# Patient Record
Sex: FEMALE | Race: WHITE | NOT HISPANIC OR LATINO | Employment: FULL TIME | URBAN - METROPOLITAN AREA
[De-identification: names, ages, dates, MRNs, and addresses within clinical notes are randomized per-mention and may not be internally consistent; named-entity substitution may affect disease eponyms.]

---

## 2018-02-14 ENCOUNTER — OFFICE VISIT (OUTPATIENT)
Dept: OBSTETRICS AND GYNECOLOGY | Facility: CLINIC | Age: 38
End: 2018-02-14

## 2018-02-14 VITALS
BODY MASS INDEX: 41.95 KG/M2 | HEART RATE: 78 BPM | SYSTOLIC BLOOD PRESSURE: 179 MMHG | HEIGHT: 70 IN | WEIGHT: 293 LBS | DIASTOLIC BLOOD PRESSURE: 97 MMHG

## 2018-02-14 DIAGNOSIS — Z30.014 ENCOUNTER FOR INITIAL PRESCRIPTION OF INTRAUTERINE CONTRACEPTIVE DEVICE (IUD): ICD-10-CM

## 2018-02-14 DIAGNOSIS — N93.9 ABNORMAL UTERINE BLEEDING (AUB): ICD-10-CM

## 2018-02-14 DIAGNOSIS — Z01.419 ENCOUNTER FOR GYNECOLOGICAL EXAMINATION WITHOUT ABNORMAL FINDING: Primary | ICD-10-CM

## 2018-02-14 PROCEDURE — 99395 PREV VISIT EST AGE 18-39: CPT | Performed by: OBSTETRICS & GYNECOLOGY

## 2018-02-14 RX ORDER — PROPRANOLOL HYDROCHLORIDE 20 MG/1
TABLET ORAL
COMMUNITY
Start: 2018-01-04

## 2018-02-14 RX ORDER — OMEPRAZOLE 20 MG/1
CAPSULE, DELAYED RELEASE ORAL
COMMUNITY
Start: 2018-01-04

## 2018-02-14 RX ORDER — LANOLIN ALCOHOL/MO/W.PET/CERES
1000 CREAM (GRAM) TOPICAL DAILY
COMMUNITY
End: 2022-02-16

## 2018-02-14 RX ORDER — LOSARTAN POTASSIUM AND HYDROCHLOROTHIAZIDE 12.5; 5 MG/1; MG/1
TABLET ORAL
COMMUNITY
Start: 2018-01-04 | End: 2022-02-16

## 2018-02-14 NOTE — PROGRESS NOTES
GYN Annual Exam     CC- Here for annual exam.     Allyson Cruz is a 37 y.o. female who presents for annual well woman exam. Periods are regular every 28-30 days, lasting 10  days. Dysmenorrhea:mild, occurring first 1-2 days of flow. Cyclic symptoms include none. No intermenstrual bleeding, spotting, or discharge.    OB History      Para Term  AB Living    3 3 3   3    SAB TAB Ectopic Multiple Live Births                  Current contraception: IUD  History of abnormal Pap smear: no  Family history of uterine, colon or ovarian cancer: no  History of abnormal mammogram: no  Family history of breast cancer: yes - maternal aunts   Last Pap : unknown    Past Medical History:   Diagnosis Date   • Anemia    • GERD (gastroesophageal reflux disease)    • Hypertension    • Psoriasis        Past Surgical History:   Procedure Laterality Date   • GALLBLADDER SURGERY           Current Outpatient Prescriptions:   •  losartan-hydrochlorothiazide (HYZAAR) 50-12.5 MG per tablet, , Disp: , Rfl:   •  omeprazole (priLOSEC) 20 MG capsule, , Disp: , Rfl:   •  propranolol (INDERAL) 20 MG tablet, , Disp: , Rfl:   •  triamcinolone (KENALOG) 0.1 % ointment, , Disp: , Rfl:   •  vitamin B-12 (CYANOCOBALAMIN) 1000 MCG tablet, Take 1,000 mcg by mouth Daily., Disp: , Rfl:     No Known Allergies    Social History   Substance Use Topics   • Smoking status: Never Smoker   • Smokeless tobacco: Never Used   • Alcohol use No       Family History   Problem Relation Age of Onset   • Heart disease Mother    • Hyperlipidemia Mother    • Diabetes Father    • Hypertension Father    • Heart disease Father    • Hyperlipidemia Father    • Breast cancer Maternal Aunt    • Ovarian cancer Neg Hx    • Uterine cancer Neg Hx    • Colon cancer Neg Hx    • Deep vein thrombosis Neg Hx    • Pulmonary embolism Neg Hx        Review of Systems   Constitutional: Negative for chills and fever.   Gastrointestinal: Negative for abdominal pain.   Genitourinary:  "Positive for menstrual problem and pelvic pain. Negative for dysuria, vaginal bleeding and vaginal discharge.   All other systems reviewed and are negative.      /97  Pulse 78  Ht 177.8 cm (70\")  Wt (!) 140 kg (308 lb)  LMP 02/02/2018 (Approximate)  Breastfeeding? No  BMI 44.19 kg/m2    Physical Exam   Constitutional: She is oriented to person, place, and time. She appears well-developed and well-nourished. No distress.   HENT:   Head: Normocephalic and atraumatic.   Eyes: Conjunctivae are normal.   Neck: Normal range of motion. Neck supple. No thyromegaly present.   Cardiovascular: Normal rate and regular rhythm.    No murmur heard.  Pulmonary/Chest: Effort normal and breath sounds normal. Right breast exhibits no inverted nipple, no mass and no nipple discharge. Left breast exhibits no inverted nipple, no mass and no nipple discharge.   Abdominal: Soft. Bowel sounds are normal. She exhibits no distension. There is no tenderness.   Genitourinary: Vagina normal and uterus normal. Pelvic exam was performed with patient supine. There is no lesion on the right labia. There is no lesion on the left labia. Uterus is not enlarged (anteverted, exam limited by habitus ), not fixed and not tender. Cervix exhibits no motion tenderness (strings seen ). Right adnexum displays no mass and no tenderness. Left adnexum displays no mass and no tenderness. No bleeding in the vagina. No vaginal discharge found.   Musculoskeletal: She exhibits no edema.   Lymphadenopathy:        Right: No inguinal adenopathy present.        Left: No inguinal adenopathy present.   Neurological: She is alert and oriented to person, place, and time.   Skin: No rash noted.   Psychiatric: She has a normal mood and affect. Her behavior is normal.          Assessment     1) GYN annual well woman exam.   2) Bleeding with IUD - IUD due to come out (at 5 year juanpablo)   She knows it is in cervix/EMELINA  Suspect this is the issue, due to replace and we are " concerned about time off for other surgery as she has new job.  So will try and replace in proper position and see if works better for her.      Plan     1) Breast Health - Clinical breast exam yearly, Self breast awareness monthly  2) Pap - updated today   3) Smoking status- non-smoker   4) Seat belts & Sunscreen recommended  5) Follow up prn and one year.     Cm Garcia MD   2/14/2018  1:00 PM

## 2018-02-19 LAB
CYTOLOGIST CVX/VAG CYTO: NORMAL
CYTOLOGY CVX/VAG DOC THIN PREP: NORMAL
DX ICD CODE: NORMAL
HIV 1 & 2 AB SER-IMP: NORMAL
HPV I/H RISK 1 DNA CVX QL PROBE+SIG AMP: NEGATIVE
OTHER STN SPEC: NORMAL
PATH REPORT.FINAL DX SPEC: NORMAL
STAT OF ADQ CVX/VAG CYTO-IMP: NORMAL

## 2018-04-18 ENCOUNTER — PROCEDURE VISIT (OUTPATIENT)
Dept: OBSTETRICS AND GYNECOLOGY | Facility: CLINIC | Age: 38
End: 2018-04-18

## 2018-04-18 VITALS
BODY MASS INDEX: 41.95 KG/M2 | SYSTOLIC BLOOD PRESSURE: 138 MMHG | HEIGHT: 70 IN | DIASTOLIC BLOOD PRESSURE: 87 MMHG | WEIGHT: 293 LBS | HEART RATE: 67 BPM

## 2018-04-18 DIAGNOSIS — Z30.430 ENCOUNTER FOR INSERTION OF MIRENA IUD: Primary | ICD-10-CM

## 2018-04-18 DIAGNOSIS — Z30.432 ENCOUNTER FOR IUD REMOVAL: ICD-10-CM

## 2018-04-18 LAB
B-HCG UR QL: NEGATIVE
INTERNAL NEGATIVE CONTROL: NEGATIVE
INTERNAL POSITIVE CONTROL: POSITIVE
Lab: NORMAL

## 2018-04-18 PROCEDURE — 58300 INSERT INTRAUTERINE DEVICE: CPT | Performed by: OBSTETRICS & GYNECOLOGY

## 2018-04-18 PROCEDURE — 58301 REMOVE INTRAUTERINE DEVICE: CPT | Performed by: OBSTETRICS & GYNECOLOGY

## 2018-04-18 PROCEDURE — 81025 URINE PREGNANCY TEST: CPT | Performed by: OBSTETRICS & GYNECOLOGY

## 2018-04-18 NOTE — PROGRESS NOTES
IUD Removal Procedure Note    Type of IUD:  Mirena  Date of insertion:  5 yrs ago  Reason for removal:  Device expiration  Other relevant history/information:  none    Procedure Time Out Documentation      Procedure Details  IUD strings visible:  yes  Local anesthesia:  None  Tenaculum used:  Yes, single tooth  Removal:  IUD strings grasped and broke off. Then able to use tenaculum, and IUD hook to bring down to her cervical os the lower aspect of IUD, I grasped that with ring forceps and removed intact.     All appropriate instructions regarding removal were reviewed.    Tolerated well  Comp : String broke, but able to remove with hook.   Post procedure diagnosis : IUD removal     Plans for contraception:  IUD    Other follow-up needed:  none    The patient was advised to call for any fever or for prolonged or severe pain or bleeding. She was advised to use NSAID as needed for mild to moderate pain.       Procedure: Mirena Intrauterine device insertion    Pre procedure indication 1) Desires Mirena  Post procedure indication 1) Desires Mirena     The risks, benefits, and alternatives to Mirena were explained at length with the patient. All her questions were answered and consents were signed. LOT# [id number], exp 10/2020. NDC# [id number]    The patient was placed in a dorsal lithotomy position on the examining table in [facility]. A bimanual exam confirmed the uterus was normal in size, anteverted. A warmed metal speculum was inserted into the vagina and the cervix was brought into view.    The cervix was prepped with Betadine. The anterior lip was grasped with a single-tooth tenaculum. The endometrial cavity was then sounded to 8 cm without use of a dilator. This sealed Mirena package was opened and the Mirena was removed in a sterile fashion.    The upper edge of the depth setting the flange was set at a uterine sound measured. The  was then carefully advanced to the cervical canal into the uterus to the level  of the fundus. This was then backed off about 1.5-2 cm to allow sufficient space for the arms to open. The slider was then retracted about 1 cm and deployed the device. The device was then gently advanced to the fundus. The Mirena was then released by pulling the slider down all the way. The  was removed carefully from the uterus. The threads were then cut leaving 2-3 cm visible outside of the cervix.  The single-tooth tenaculum was removed from the anterior lip. Good hemostasis was noted.     All other instruments were removed from the vagina.   There were no complications.  The patient tolerated the procedure well with a minimal amount of discomfort.    The patient was counseled about the need to return in 2 weeks for string check.     She was counseled about the need to use a backup method of contraception such as condoms until her post insertion exam was performed. The patient verbalized understanding that the Mirena will need to be removed/replaced after 5 years. The patient is counseled to contact us if she has any significant or increasing bleeding, pain, fever, chills, or other concerns. She is instructed to see a doctor right away if she believes that she may be pregnant at any time with the Mirena in place.    Cm Garcia MD  4/18/2018  1:35 PM

## 2018-05-16 ENCOUNTER — OFFICE VISIT (OUTPATIENT)
Dept: OBSTETRICS AND GYNECOLOGY | Facility: CLINIC | Age: 38
End: 2018-05-16

## 2018-05-16 VITALS
HEIGHT: 70 IN | SYSTOLIC BLOOD PRESSURE: 135 MMHG | DIASTOLIC BLOOD PRESSURE: 81 MMHG | WEIGHT: 293 LBS | HEART RATE: 72 BPM | BODY MASS INDEX: 41.95 KG/M2

## 2018-05-16 DIAGNOSIS — Z97.5 BREAKTHROUGH BLEEDING WITH IUD: ICD-10-CM

## 2018-05-16 DIAGNOSIS — N92.1 BREAKTHROUGH BLEEDING WITH IUD: ICD-10-CM

## 2018-05-16 DIAGNOSIS — Z30.431 IUD CHECK UP: Primary | ICD-10-CM

## 2018-05-16 PROCEDURE — 99213 OFFICE O/P EST LOW 20 MIN: CPT | Performed by: OBSTETRICS & GYNECOLOGY

## 2018-05-16 NOTE — PROGRESS NOTES
"Here for follow up of IUD insertion.      Allyson Cruz is a 37 y.o. patient who presents for follow up of IUD insertion 2018. Pt has had spotting and cramping the past few days.      History of Present Illness     37 y.o.  - s/p replacement of IUD due to being Lower uterine segment and having BTB on device. Still with some spotting, but that is normal adjustment in my experience.     Review of Systems   Constitutional: Negative for chills and fever.   Gastrointestinal: Negative for abdominal pain.   Genitourinary: Positive for vaginal bleeding. Negative for dysuria, pelvic pain and vaginal discharge.   All other systems reviewed and are negative.      /81   Pulse 72   Ht 177.8 cm (70\")   Wt (!) 140 kg (309 lb)   Breastfeeding? No   BMI 44.34 kg/m²     Physical Exam   Constitutional: She appears well-developed and well-nourished. No distress.   Abdominal: Soft. Bowel sounds are normal. She exhibits no distension. There is no tenderness.   Genitourinary: Uterus normal. Pelvic exam was performed with patient supine. There is no lesion on the right labia. There is no lesion on the left labia. Uterus is not enlarged, not fixed and not tender. Cervix exhibits no motion tenderness (strings seen ). Right adnexum displays no mass and no tenderness. Left adnexum displays no mass and no tenderness. There is bleeding in the vagina. No vaginal discharge found.   Lymphadenopathy:        Right: No inguinal adenopathy present.        Left: No inguinal adenopathy present.         Assessment/Plan   Problems Addressed this Visit     None      Visit Diagnoses     IUD check up    -  Primary    Breakthrough bleeding with IUD              IUD strings seen, reassured     2) BTB on IUD  Was due to lower in position.   Ok to have adjustment bleeding.   Expectations and follow up reviewed.                Cm Garcia MD   2018  2:22 PM  " 02-Apr-2018 21:44

## 2022-02-16 ENCOUNTER — OFFICE VISIT (OUTPATIENT)
Dept: OBSTETRICS AND GYNECOLOGY | Facility: CLINIC | Age: 42
End: 2022-02-16

## 2022-02-16 VITALS
SYSTOLIC BLOOD PRESSURE: 138 MMHG | WEIGHT: 293 LBS | BODY MASS INDEX: 41.95 KG/M2 | HEIGHT: 70 IN | DIASTOLIC BLOOD PRESSURE: 90 MMHG

## 2022-02-16 DIAGNOSIS — R10.2 PELVIC PAIN: ICD-10-CM

## 2022-02-16 DIAGNOSIS — Z01.419 ENCOUNTER FOR GYNECOLOGICAL EXAMINATION WITHOUT ABNORMAL FINDING: Primary | ICD-10-CM

## 2022-02-16 PROCEDURE — 99213 OFFICE O/P EST LOW 20 MIN: CPT | Performed by: OBSTETRICS & GYNECOLOGY

## 2022-02-16 PROCEDURE — 99386 PREV VISIT NEW AGE 40-64: CPT | Performed by: OBSTETRICS & GYNECOLOGY

## 2022-02-16 RX ORDER — TELMISARTAN AND HYDROCHLORTHIAZIDE 40; 12.5 MG/1; MG/1
1 TABLET ORAL DAILY
COMMUNITY
Start: 2021-12-08

## 2022-02-16 RX ORDER — IBUPROFEN 800 MG/1
TABLET ORAL
COMMUNITY
Start: 2022-01-20

## 2022-02-16 NOTE — PROGRESS NOTES
GYN Annual Exam     CC- Here for annual exam.     Allyson Cruz is a 41 y.o. female who presents for annual well woman exam. Periods are rare due to IUD.   Pt c/o pain in her ovaries and pressure in her rectum during the time when she is ovulating.   Pt notices pain with urination and BM during this time.     OB History        3    Para   3    Term   3            AB        Living   3       SAB        IAB        Ectopic        Molar        Multiple        Live Births                    Current contraception: IUD-inserted 2018  History of abnormal Pap smear: no  Family history of uterine, colon or ovarian cancer: no  History of abnormal mammogram: no  Family history of breast cancer: yes - manteral and paternal aunt   Last Pap : 2018 NL HPV neg   Last Mammo: never    Past Medical History:   Diagnosis Date   • Anemia    • GERD (gastroesophageal reflux disease)    • Hypertension    • Psoriasis        Past Surgical History:   Procedure Laterality Date   • CHOLECYSTECTOMY           Current Outpatient Medications:   •  telmisartan-hydrochlorothiazide (MICARDIS HCT) 40-12.5 MG per tablet, Take 1 tablet by mouth Daily., Disp: , Rfl:   •  ibuprofen (ADVIL,MOTRIN) 800 MG tablet, , Disp: , Rfl:   •  MIRENA, 52 MG, 20 MCG/24HR IUD, , Disp: , Rfl:   •  omeprazole (priLOSEC) 20 MG capsule, , Disp: , Rfl:   •  propranolol (INDERAL) 20 MG tablet, , Disp: , Rfl:     No Known Allergies    Social History     Tobacco Use   • Smoking status: Never Smoker   • Smokeless tobacco: Never Used   Substance Use Topics   • Alcohol use: No   • Drug use: No       Family History   Problem Relation Age of Onset   • Heart disease Mother    • Hyperlipidemia Mother    • Diabetes Father    • Hypertension Father    • Heart disease Father    • Hyperlipidemia Father    • Breast cancer Maternal Aunt    • Breast cancer Paternal Aunt    • Ovarian cancer Neg Hx    • Uterine cancer Neg Hx    • Colon cancer Neg Hx    • Deep vein  "thrombosis Neg Hx    • Pulmonary embolism Neg Hx        Review of Systems   Constitutional: Negative for chills and fever.   Gastrointestinal: Negative for abdominal pain, constipation and diarrhea.   Genitourinary: Negative for menstrual problem, pelvic pain, vaginal bleeding and vaginal discharge.   All other systems reviewed and are negative.      Ht 177.8 cm (70\")   Wt (!) 137 kg (302 lb)   Breastfeeding No   BMI 43.33 kg/m²     Physical Exam  Constitutional:       General: She is not in acute distress.     Appearance: She is well-developed. She is obese.   Genitourinary:      Vulva normal.      Right Labia: No lesions or Bartholin's cyst.     Left Labia: No lesions or Bartholin's cyst.     No inguinal adenopathy present in the right or left side.     No vaginal discharge or bleeding.        Right Adnexa: not tender, not full and no mass present.     Left Adnexa: not tender, not full and no mass present.     No cervical motion tenderness or friability.      IUD strings visualized.      Uterus is not enlarged (limited by habitus ) or tender.      No uterine mass detected.     Uterus is anteverted.   Breasts:      Right: No inverted nipple, mass or nipple discharge.      Left: No inverted nipple, mass or nipple discharge.       HENT:      Head: Normocephalic and atraumatic.      Nose: Nose normal.   Eyes:      Conjunctiva/sclera: Conjunctivae normal.      Pupils: Pupils are equal, round, and reactive to light.   Neck:      Thyroid: No thyromegaly.   Cardiovascular:      Rate and Rhythm: Normal rate and regular rhythm.      Heart sounds: Normal heart sounds. No murmur heard.      Pulmonary:      Effort: Pulmonary effort is normal. No respiratory distress.      Breath sounds: Normal breath sounds.   Abdominal:      General: Abdomen is flat. There is no distension.      Palpations: Abdomen is soft.      Tenderness: There is no abdominal tenderness.   Musculoskeletal:         General: No deformity. Normal range of " motion.      Cervical back: Normal range of motion and neck supple.      Right lower leg: No edema.      Left lower leg: No edema.   Lymphadenopathy:      Lower Body: No right inguinal adenopathy. No left inguinal adenopathy.   Neurological:      Mental Status: She is alert and oriented to person, place, and time.   Skin:     General: Skin is warm and dry.      Findings: No erythema.   Psychiatric:         Behavior: Behavior normal.         Thought Content: Thought content normal.         Judgment: Judgment normal.   Vitals reviewed. Exam conducted with a chaperone present.               Assessment     Diagnoses and all orders for this visit:    1. Encounter for gynecological examination without abnormal finding (Primary)  -     IGP, Apt HPV,rfx 16 / 18,45    2. Pelvic pain  -     US Non-ob Transvaginal  -     Urine Culture - , Urine, Clean Catch    1) GYN exam   Expectations reviewed.   2) Pelvic pain   Check urine culture, GYN ultrasound   If nothing significant found, expectations reviewed.      Plan     1) Breast Health - Clinical breast exam yearly, Discussed American cancer society recommendations for breast cancer screening, and Self breast awareness monthly  2) Pap - updated today   3) Smoking status- non-smoker   4) Encouraged to be wary of information obtained via social media and internet based on source and search.  5) Follow up prn and one year.       Cm Garcia MD   2/16/2022  13:55 EST

## 2022-02-18 ENCOUNTER — TELEPHONE (OUTPATIENT)
Dept: OBSTETRICS AND GYNECOLOGY | Facility: CLINIC | Age: 42
End: 2022-02-18

## 2022-02-18 LAB
BACTERIA UR CULT: NORMAL
BACTERIA UR CULT: NORMAL

## 2022-02-18 NOTE — TELEPHONE ENCOUNTER
----- Message from Cm Garcia MD sent at 2/18/2022  7:26 AM EST -----  Elisa, normal urine culture. Please let her know. Thanks Dr. Garcia

## 2022-02-21 ENCOUNTER — PROCEDURE VISIT (OUTPATIENT)
Dept: OBSTETRICS AND GYNECOLOGY | Facility: CLINIC | Age: 42
End: 2022-02-21

## 2022-02-21 ENCOUNTER — APPOINTMENT (OUTPATIENT)
Dept: WOMENS IMAGING | Facility: HOSPITAL | Age: 42
End: 2022-02-21

## 2022-02-21 DIAGNOSIS — Z12.31 VISIT FOR SCREENING MAMMOGRAM: Primary | ICD-10-CM

## 2022-02-21 LAB
CYTOLOGIST CVX/VAG CYTO: NORMAL
CYTOLOGY CVX/VAG DOC CYTO: NORMAL
CYTOLOGY CVX/VAG DOC THIN PREP: NORMAL
DX ICD CODE: NORMAL
HIV 1 & 2 AB SER-IMP: NORMAL
HPV I/H RISK 4 DNA CVX QL PROBE+SIG AMP: NEGATIVE
OTHER STN SPEC: NORMAL
STAT OF ADQ CVX/VAG CYTO-IMP: NORMAL

## 2022-02-21 PROCEDURE — 77067 SCR MAMMO BI INCL CAD: CPT | Performed by: OBSTETRICS & GYNECOLOGY

## 2022-02-21 PROCEDURE — 77063 BREAST TOMOSYNTHESIS BI: CPT | Performed by: RADIOLOGY

## 2022-02-21 PROCEDURE — 77067 SCR MAMMO BI INCL CAD: CPT | Performed by: RADIOLOGY

## 2022-02-21 PROCEDURE — 77063 BREAST TOMOSYNTHESIS BI: CPT | Performed by: OBSTETRICS & GYNECOLOGY

## 2022-07-14 ENCOUNTER — OFFICE VISIT (OUTPATIENT)
Dept: OBSTETRICS AND GYNECOLOGY | Facility: CLINIC | Age: 42
End: 2022-07-14

## 2022-07-14 VITALS
HEART RATE: 77 BPM | BODY MASS INDEX: 41.95 KG/M2 | HEIGHT: 70 IN | DIASTOLIC BLOOD PRESSURE: 98 MMHG | WEIGHT: 293 LBS | SYSTOLIC BLOOD PRESSURE: 150 MMHG

## 2022-07-14 DIAGNOSIS — R10.2 PELVIC PAIN: Primary | ICD-10-CM

## 2022-07-14 DIAGNOSIS — N90.89 VULVAR LESION: ICD-10-CM

## 2022-07-14 DIAGNOSIS — Z97.5 BREAKTHROUGH BLEEDING ASSOCIATED WITH INTRAUTERINE DEVICE (IUD): ICD-10-CM

## 2022-07-14 DIAGNOSIS — N92.1 BREAKTHROUGH BLEEDING ASSOCIATED WITH INTRAUTERINE DEVICE (IUD): ICD-10-CM

## 2022-07-14 PROCEDURE — 99214 OFFICE O/P EST MOD 30 MIN: CPT | Performed by: OBSTETRICS & GYNECOLOGY

## 2022-07-14 RX ORDER — LORATADINE 10 MG/1
TABLET ORAL DAILY
COMMUNITY

## 2022-07-14 NOTE — PROGRESS NOTES
"Subjective    is a 41 y.o. female here with a few c/o's today. Her main concern is a pain she has been having in the RLQ radiating across her abdomen. Pain comes and goes, feels like pressure as she needs to have a BM. Pain started last month, during the time she feels she is ovulating. She started spotting Monday and again today. She has an IUD in place. She also has a h/o a Bartholin Cyst on her R side. Notices some swelling on her R labia.     Chief Complaint   Patient presents with   • Abdominal Pain        HPI     41 y.o.    A few concerns.   1) One month history of RLQ pain - Stabbing pain, has been easing up over time. But still with lots of pelvic pressure associated with this pain.   2) Breakthrough bleeding on IUD - spotting last few days   3) Hx of bartholin cyst and right sided labial swelling       Review of Systems   Constitutional: Negative for fever.   Gastrointestinal: Positive for abdominal pain. Negative for constipation, diarrhea, nausea and vomiting.   Genitourinary: Positive for dysuria, pelvic pain, pelvic pressure and vaginal bleeding. Negative for frequency and vaginal discharge.        Objective   /98   Pulse 77   Ht 177.8 cm (70\")   Wt 133 kg (293 lb)   Breastfeeding No   BMI 42.04 kg/m²   Physical Exam  Constitutional:       General: She is not in acute distress.     Appearance: Normal appearance. She is well-developed. She is obese.   Genitourinary:      Right Labia: No lesions or Bartholin's cyst.     Left Labia: No lesions or Bartholin's cyst.     No inguinal adenopathy present in the right or left side.     No vaginal discharge or bleeding.        Right Adnexa: not tender, not full and no mass present.     Left Adnexa: not tender, not full and no mass present.     No cervical motion tenderness or friability.      IUD strings visualized.      Uterus is not enlarged or tender.      No uterine mass detected.     Uterus is midaxial.   Abdominal:      General: Abdomen is " flat. There is no distension.      Palpations: Abdomen is soft.      Tenderness: There is no abdominal tenderness.   Lymphadenopathy:      Lower Body: No right inguinal adenopathy. No left inguinal adenopathy.   Neurological:      Mental Status: She is alert.   Vitals reviewed. Exam conducted with a chaperone present.          Assessment/Plan   Diagnoses and all orders for this visit:    1. Pelvic pain (Primary)  -     US Non-ob Transvaginal  -     Urine Culture - , Urine, Clean Catch  -     Chlamydia trachomatis, Neisseria gonorrhoeae, Trichomonas vaginalis, PCR - Swab, Cervix    2. Breakthrough bleeding associated with intrauterine device (IUD)  -     US Non-ob Transvaginal  -     Urine Culture - , Urine, Clean Catch  -     Chlamydia trachomatis, Neisseria gonorrhoeae, Trichomonas vaginalis, PCR - Swab, Cervix    3. Vulvar lesion    1) RLQ pain and BTB  Check for infection or anatomic issue with cultures and ultrasound.   On suppression with IUD   So if nothing found and pain persists, consider urinary work up/GI work up vs Laparoscopy     2) Vulvar lesion   No batholin seen or felt  Describes temporary swelling that drains blood after warm compress?  Hidradenitis?      Cm Garcia MD   7/14/2022  13:24 EDT

## 2022-07-16 LAB
BACTERIA UR CULT: NO GROWTH
BACTERIA UR CULT: NORMAL
C TRACH RRNA SPEC QL NAA+PROBE: NEGATIVE
N GONORRHOEA RRNA SPEC QL NAA+PROBE: NEGATIVE
ONE SPECIMEN IDENTIFIER: NORMAL
ONE SPECIMEN IDENTIFIER: NORMAL
T VAGINALIS RRNA SPEC QL NAA+PROBE: NEGATIVE

## 2022-07-18 ENCOUNTER — TELEPHONE (OUTPATIENT)
Dept: OBSTETRICS AND GYNECOLOGY | Facility: CLINIC | Age: 42
End: 2022-07-18

## 2022-07-18 NOTE — TELEPHONE ENCOUNTER
"Pt aware \"Elisa, please let her know the STD screen for Chlamydia, Gonorrhea and trichomoniasis is negative. Urine culture negative. Thanks, Dr. Garcia\"  "

## 2022-07-18 NOTE — TELEPHONE ENCOUNTER
----- Message from Elisa Hong MA sent at 7/18/2022 11:07 AM EDT -----  L/m for pt/nathaly  ----- Message -----  From: Cm Garcia MD  Sent: 7/18/2022  10:13 AM EDT  To: TASHIA Alcantar, please let her know the STD screen for Chlamydia, Gonorrhea and trichomoniasis is negative. Urine culture negative. Thanks, Dr. Garcia

## 2022-07-19 ENCOUNTER — TELEPHONE (OUTPATIENT)
Dept: OBSTETRICS AND GYNECOLOGY | Facility: CLINIC | Age: 42
End: 2022-07-19

## 2022-07-19 NOTE — TELEPHONE ENCOUNTER
Elisa,     Her GYN ultrasound is unremarkable.   IUD in place and otherwise normal uterus and ovaries.   Please let her know.   Discussed next steps in office if pain continues.     Thanks,   Dr. Garcia

## 2024-01-21 NOTE — PROGRESS NOTES
GYN ANNUAL EXAM     Chief Complaint   Patient presents with    Gynecologic Exam     Patient here for annual exam. Last pap-2022-neg; mx-today       HPI  Allyson is a 43 y.o. female who presents for annual well woman exam. She is a patient of Dr. Garcia.     OB History          3    Para   3    Term   3            AB        Living   3         SAB        IAB        Ectopic        Molar        Multiple        Live Births                    MENSTRUAL & SEXUAL HEALTH  LMP: Patient's last menstrual period was 2024 (exact date). This was mostly spotting, which she says has been her normal since having her IUD placed.   CURRENTLY SEXUALLY ACTIVE: is  SEXUAL PREFERENCE: men  MENSES REGULARITY: rare spotting   MENSES LENGTH: 0 days  DYSMENORRHEA: none  CYCLIC SYMPTOMS: none  CURRENT CONTRACEPTION: IUD  LAST PAP: ,  NIL  HISTORY OF ABNORMAL PAP SMEAR: no  HISTORY OF STD: No  FAMILY HISTORY OF CANCER: breast cancer       FAMILY HISTORY OF BREAST CANCER: yes - maternal & paternal aunts  PERFORMS SBE: performs monthly.  MAMMOGRAM: , Birads I (Normal). Mammogram was done today prior to her appointment.   HISTORY OF ABNORMAL MAMMOGRAM: no  INCONTINENCE: no  DYSPAREUNIA: no      SUBJECTIVE  Past Medical History:   Diagnosis Date    Anemia     GERD (gastroesophageal reflux disease)     Hypertension     Psoriasis        Past Surgical History:   Procedure Laterality Date    CHOLECYSTECTOMY           Current Outpatient Medications:     ibuprofen (ADVIL,MOTRIN) 800 MG tablet, , Disp: , Rfl:     methocarbamol (ROBAXIN) 500 MG tablet, Take 1 tablet by mouth., Disp: , Rfl:     MIRENA, 52 MG, 20 MCG/24HR IUD, , Disp: , Rfl:     omeprazole (priLOSEC) 20 MG capsule, 2 capsules., Disp: , Rfl:     phentermine 30 MG capsule, Take 1 capsule by mouth Every Morning., Disp: , Rfl:     propranolol (INDERAL) 20 MG tablet, , Disp: , Rfl:     telmisartan-hydrochlorothiazide (MICARDIS HCT) 40-12.5 MG per tablet, Take 1  "tablet by mouth Daily., Disp: , Rfl:     loratadine (CLARITIN) 10 MG tablet, Daily., Disp: , Rfl:     No Known Allergies    Social History     Tobacco Use    Smoking status: Never    Smokeless tobacco: Never   Substance Use Topics    Alcohol use: No    Drug use: No       Family History   Problem Relation Age of Onset    Heart disease Mother     Hyperlipidemia Mother     Diabetes Father     Hypertension Father     Heart disease Father     Hyperlipidemia Father     Breast cancer Maternal Aunt     Breast cancer Paternal Aunt     Ovarian cancer Neg Hx     Uterine cancer Neg Hx     Colon cancer Neg Hx     Deep vein thrombosis Neg Hx     Pulmonary embolism Neg Hx        Review of Systems   All other systems reviewed and are negative.      OBJECTIVE   /91   Pulse 82   Ht 177.8 cm (70\")   Wt (!) 137 kg (301 lb 9.6 oz)   LMP 01/24/2024 (Exact Date) Comment: Mirena 2018 insert  BMI 43.28 kg/m²     Physical Exam  Constitutional:       General: She is awake. She is not in acute distress.     Appearance: Normal appearance. She is well-developed and well-groomed. She is not ill-appearing.   Genitourinary:      Vulva, bladder and urethral meatus normal.      No lesions in the vagina.      Right Labia: No rash, tenderness, lesions or skin changes.     Left Labia: No tenderness, lesions, skin changes or rash.     No labial fusion noted.      No inguinal adenopathy present in the right or left side.     No vaginal discharge, erythema, tenderness, bleeding, ulceration or granulation tissue.      No vaginal prolapse present.     No vaginal atrophy present.       Right Adnexa: not tender and no mass present.     Left Adnexa: not tender and no mass present.     No cervical discharge, friability, lesion, polyp, eversion or elongation.      IUD strings visualized.      Uterus is not enlarged, tender or prolapsed.      No urethral prolapse or discharge present.      Bladder is not tender.       Pelvic exam was performed with " patient in the lithotomy position.   Breasts:     Breasts are symmetrical.      Breasts are soft.     Right: Normal. No bleeding, inverted nipple, mass, nipple discharge, skin change or tenderness.      Left: Normal. No bleeding, inverted nipple, mass, nipple discharge, skin change or tenderness.   HENT:      Head: Normocephalic and atraumatic.   Eyes:      General: No scleral icterus.     Conjunctiva/sclera: Conjunctivae normal.   Cardiovascular:      Rate and Rhythm: Normal rate and regular rhythm.      Heart sounds: Normal heart sounds.   Pulmonary:      Effort: Pulmonary effort is normal.      Breath sounds: Normal breath sounds.   Abdominal:      Palpations: Abdomen is soft.      Tenderness: There is no abdominal tenderness. There is no guarding or rebound.      Hernia: There is no hernia in the left inguinal area or right inguinal area.   Musculoskeletal:         General: Normal range of motion.      Cervical back: Normal range of motion.   Lymphadenopathy:      Upper Body:      Right upper body: No supraclavicular or axillary adenopathy.      Left upper body: No supraclavicular or axillary adenopathy.      Lower Body: No right inguinal adenopathy. No left inguinal adenopathy.   Neurological:      Mental Status: She is alert and oriented to person, place, and time.   Skin:     General: Skin is warm and dry.      Coloration: Skin is not jaundiced or pale.   Psychiatric:         Behavior: Behavior normal. Behavior is cooperative.   Vitals reviewed.         ASSESSMENT   Diagnoses and all orders for this visit:    1. Encounter for gynecological examination without abnormal finding (Primary)  -     IGP, Apt HPV,rfx 16 / 18,45    2. Encounter for screening for malignant neoplasm of vagina  -     IGP, Apt HPV,rfx 16 / 18,45    3. Screening for malignant neoplasm of cervix  -     IGP, Apt HPV,rfx 16 / 18,45    4. Encounter for screening for malignant neoplasm of breast, unspecified screening modality    5. IUD  (intrauterine device) in place  -     IGP, Apt HPV,rfx 16 / 18,45         PLAN   WELL WOMAN EXAM: Pap smear collected today. Recommend MVI daily.    CONTRACEPTION: IUD - PAINS discussed.    STD: STD screen today- declines today, encouraged use of condoms.  SMOKING STATUS: non smoker.  BREAST HEALTH: Encouraged annual mammogram screening. Instructed on how to perform SBE. Encouraged breast health self awareness. Mammogram today.   EXERCISE: Encouraged 150 minutes of exercise per week if not medially contraindicated.   BMI: Body mass index is 43.28 kg/m².    Return for annual physical.    I spent 15 minutes caring for Allyson on this date of service. This time includes time spent by me in the following activities: preparing for the visit, reviewing tests, obtaining and/or reviewing a separately obtained history, performing a medically appropriate examination and/or evaluation, counseling and educating the patient/family/caregiver, ordering medications, tests, or procedures, referring and communicating with other health care professionals, documenting information in the medical record, independently interpreting results and communicating that information with the patient/family/caregiver, and care coordination.        Opal Lowery CNM  1/25/2024  18:58 EST

## 2024-01-25 ENCOUNTER — PROCEDURE VISIT (OUTPATIENT)
Dept: OBSTETRICS AND GYNECOLOGY | Facility: CLINIC | Age: 44
End: 2024-01-25
Payer: COMMERCIAL

## 2024-01-25 ENCOUNTER — OFFICE VISIT (OUTPATIENT)
Dept: OBSTETRICS AND GYNECOLOGY | Facility: CLINIC | Age: 44
End: 2024-01-25
Payer: COMMERCIAL

## 2024-01-25 VITALS
DIASTOLIC BLOOD PRESSURE: 91 MMHG | WEIGHT: 293 LBS | HEIGHT: 70 IN | BODY MASS INDEX: 41.95 KG/M2 | SYSTOLIC BLOOD PRESSURE: 150 MMHG | HEART RATE: 82 BPM

## 2024-01-25 DIAGNOSIS — Z12.72 ENCOUNTER FOR SCREENING FOR MALIGNANT NEOPLASM OF VAGINA: ICD-10-CM

## 2024-01-25 DIAGNOSIS — Z97.5 IUD (INTRAUTERINE DEVICE) IN PLACE: ICD-10-CM

## 2024-01-25 DIAGNOSIS — Z12.4 SCREENING FOR MALIGNANT NEOPLASM OF CERVIX: ICD-10-CM

## 2024-01-25 DIAGNOSIS — Z01.419 ENCOUNTER FOR GYNECOLOGICAL EXAMINATION WITHOUT ABNORMAL FINDING: Primary | ICD-10-CM

## 2024-01-25 DIAGNOSIS — Z12.31 VISIT FOR SCREENING MAMMOGRAM: Primary | ICD-10-CM

## 2024-01-25 DIAGNOSIS — Z12.39 ENCOUNTER FOR SCREENING FOR MALIGNANT NEOPLASM OF BREAST, UNSPECIFIED SCREENING MODALITY: ICD-10-CM

## 2024-01-25 RX ORDER — PHENTERMINE HYDROCHLORIDE 30 MG/1
30 CAPSULE ORAL EVERY MORNING
COMMUNITY

## 2024-01-25 RX ORDER — METHOCARBAMOL 500 MG/1
500 TABLET, FILM COATED ORAL
COMMUNITY
Start: 2023-11-28

## 2025-01-29 ENCOUNTER — PROCEDURE VISIT (OUTPATIENT)
Dept: OBSTETRICS AND GYNECOLOGY | Facility: CLINIC | Age: 45
End: 2025-01-29
Payer: COMMERCIAL

## 2025-01-29 ENCOUNTER — OFFICE VISIT (OUTPATIENT)
Dept: OBSTETRICS AND GYNECOLOGY | Facility: CLINIC | Age: 45
End: 2025-01-29
Payer: COMMERCIAL

## 2025-01-29 VITALS
WEIGHT: 284 LBS | BODY MASS INDEX: 40.66 KG/M2 | SYSTOLIC BLOOD PRESSURE: 128 MMHG | HEIGHT: 70 IN | DIASTOLIC BLOOD PRESSURE: 83 MMHG

## 2025-01-29 DIAGNOSIS — Z01.419 ENCOUNTER FOR GYNECOLOGICAL EXAMINATION WITHOUT ABNORMAL FINDING: Primary | ICD-10-CM

## 2025-01-29 DIAGNOSIS — Z12.31 VISIT FOR SCREENING MAMMOGRAM: Primary | ICD-10-CM

## 2025-01-29 RX ORDER — OMEPRAZOLE 40 MG/1
40 CAPSULE, DELAYED RELEASE ORAL DAILY
COMMUNITY
Start: 2023-10-25

## 2025-01-29 NOTE — PROGRESS NOTES
GYN ANNUAL EXAM   Chief Complaint   Patient presents with    Annual Exam     AE and last pap ,MX today       ARELIS Valadez is a 44 y.o. female who presents for annual well woman exam. She is a patient of Dr. Garcia.     OB History          3    Para   3    Term   3            AB        Living   3         SAB        IAB        Ectopic        Molar        Multiple        Live Births                    SUBJECTIVE    MENSTRUAL & SEXUAL HEALTH  LMP: No LMP recorded. Patient has had an implant.  Menses regularity: amenorrheic with Mirena  Menses length: n/a  Dysmenorrhea: none  Cyclic symptoms: none  Current contraception: IUD - Mirena placed 2018  Last pap: , Normal PAP  History of abnormal pap: no  History of STD: No  Family history of cancer: breast cancer       Family history of breast cancer: yes - maternal and paternal aunts  Performs SBE: performs monthly.  Mammogram: , Birads I (Normal).  History of abnormal mammogram: no  Incontinence: Patient reports that she is not currently experiencing any symptoms of urinary incontinence.   Dyspareunia: no    Past Medical History:   Diagnosis Date    Anemia     GERD (gastroesophageal reflux disease)     Hypertension     Psoriasis        Past Surgical History:   Procedure Laterality Date    CHOLECYSTECTOMY           Current Outpatient Medications:     ibuprofen (ADVIL,MOTRIN) 800 MG tablet, , Disp: , Rfl:     MIRENA, 52 MG, 20 MCG/24HR IUD, , Disp: , Rfl:     omeprazole (priLOSEC) 40 MG capsule, Take 1 capsule by mouth Daily., Disp: , Rfl:     propranolol (INDERAL) 20 MG tablet, , Disp: , Rfl:     telmisartan-hydrochlorothiazide (MICARDIS HCT) 40-12.5 MG per tablet, Take 1 tablet by mouth Daily., Disp: , Rfl:     methocarbamol (ROBAXIN) 500 MG tablet, Take 1 tablet by mouth., Disp: , Rfl:     phentermine 30 MG capsule, Take 1 capsule by mouth Every Morning., Disp: , Rfl:     No Known Allergies    Social History     Tobacco Use    Smoking  "status: Never    Smokeless tobacco: Never   Substance Use Topics    Alcohol use: No    Drug use: No       Family History   Problem Relation Age of Onset    Heart disease Mother     Hyperlipidemia Mother     Diabetes Father     Hypertension Father     Heart disease Father     Hyperlipidemia Father     Breast cancer Maternal Aunt     Breast cancer Paternal Aunt     Ovarian cancer Neg Hx     Uterine cancer Neg Hx     Colon cancer Neg Hx     Deep vein thrombosis Neg Hx     Pulmonary embolism Neg Hx        Review of Systems   Constitutional:  Negative for fatigue, unexpected weight gain and unexpected weight loss.   Gastrointestinal:  Negative for abdominal pain.   Genitourinary:  Negative for decreased libido, difficulty urinating, dyspareunia, dysuria, pelvic pain, pelvic pressure, urgency, urinary incontinence and vaginal discharge.   All other systems reviewed and are negative.      OBJECTIVE     /83   Ht 177.8 cm (70\")   Wt 129 kg (284 lb)   BMI 40.75 kg/m²     Physical Exam  Constitutional:       General: She is awake. She is not in acute distress.     Appearance: Normal appearance. She is well-developed, well-groomed and normal weight. She is not ill-appearing.   Genitourinary:      Vulva and urethral meatus normal.      No lesions in the vagina.      Right Labia: No rash, tenderness, lesions or skin changes.     Left Labia: No tenderness, lesions, skin changes or rash.     No labial fusion noted.      No inguinal adenopathy present in the right or left side.     No vaginal discharge, tenderness or ulceration.        Right Adnexa: not tender and no mass present.     Left Adnexa: not tender and no mass present.     No cervical discharge, lesion, polyp, eversion or elongation.      IUD strings visualized.      No urethral prolapse or discharge present.      Bladder is not tender.       Pelvic exam was performed with patient in the lithotomy position.   Breasts:     Breasts are soft.     Right: No bleeding, " inverted nipple, mass, nipple discharge, skin change or tenderness.      Left: No bleeding, inverted nipple, mass, nipple discharge, skin change or tenderness.   HENT:      Head: Normocephalic and atraumatic.   Eyes:      General: No scleral icterus.     Conjunctiva/sclera: Conjunctivae normal.   Cardiovascular:      Rate and Rhythm: Normal rate and regular rhythm.      Heart sounds: Normal heart sounds.   Pulmonary:      Effort: Pulmonary effort is normal.      Breath sounds: Normal breath sounds.   Abdominal:      Palpations: Abdomen is soft.      Tenderness: There is no abdominal tenderness. There is no guarding or rebound.      Hernia: There is no hernia in the left inguinal area or right inguinal area.   Musculoskeletal:         General: Normal range of motion.      Cervical back: Normal range of motion.   Lymphadenopathy:      Upper Body:      Right upper body: No supraclavicular or axillary adenopathy.      Left upper body: No supraclavicular or axillary adenopathy.      Lower Body: No right inguinal adenopathy. No left inguinal adenopathy.   Neurological:      Mental Status: She is alert and oriented to person, place, and time.   Skin:     General: Skin is warm and dry.      Coloration: Skin is not jaundiced or pale.   Psychiatric:         Behavior: Behavior normal. Behavior is cooperative.   Vitals reviewed.         ASSESSMENT     Diagnoses and all orders for this visit:    1. Encounter for gynecological examination without abnormal finding (Primary)  -     IGP, Apt HPV,rfx 16 / 18,45         PLAN   WELL WOMAN EXAM: Pap smear collected today. Recommend MVI daily.    CONTRACEPTION: IUD - PAINS discussed. Mirena due to be removed/replaced before 04/18/2026.   SMOKING STATUS: non smoker.  BREAST HEALTH: Encouraged annual mammogram screening. Instructed on how to perform SBE. Encouraged breast health self awareness.   EXERCISE: Encouraged 150 minutes of exercise per week if not medially contraindicated.   BMI:  Body mass index is 40.75 kg/m².    Return in about 1 year (around 1/29/2026) for annual exam or as needed before.    I spent 30 minutes caring for Allyson on this date of service. This time includes time spent by me in the following activities: preparing for the visit, reviewing tests, performing a medically appropriate examination and/or evaluation, counseling and educating the patient/family/caregiver, referring and communicating with other health care professionals, documenting information in the medical record, independently interpreting results and communicating that information with the patient/family/caregiver, care coordination, ordering medications, ordering test(s), ordering procedure(s), obtaining a separately obtained history, and reviewing a separately obtained history.    Opal Lowery CNM  2/2/2025  09:30 EST

## 2025-02-03 LAB
CYTOLOGIST CVX/VAG CYTO: NORMAL
CYTOLOGY CVX/VAG DOC CYTO: NORMAL
CYTOLOGY CVX/VAG DOC THIN PREP: NORMAL
DX ICD CODE: NORMAL
HPV I/H RISK 4 DNA CVX QL PROBE+SIG AMP: NEGATIVE
Lab: NORMAL
OTHER STN SPEC: NORMAL
STAT OF ADQ CVX/VAG CYTO-IMP: NORMAL